# Patient Record
Sex: MALE | Race: WHITE | Employment: OTHER | URBAN - METROPOLITAN AREA
[De-identification: names, ages, dates, MRNs, and addresses within clinical notes are randomized per-mention and may not be internally consistent; named-entity substitution may affect disease eponyms.]

---

## 2019-01-16 ENCOUNTER — HOSPITAL ENCOUNTER (EMERGENCY)
Facility: CLINIC | Age: 84
Discharge: HOME | End: 2019-01-16
Attending: EMERGENCY MEDICINE
Payer: MEDICARE

## 2019-01-16 ENCOUNTER — APPOINTMENT (OUTPATIENT)
Dept: CT IMAGING | Facility: CLINIC | Age: 84
End: 2019-01-16
Payer: MEDICARE

## 2019-01-16 ENCOUNTER — APPOINTMENT (OUTPATIENT)
Dept: GENERAL RADIOLOGY | Facility: CLINIC | Age: 84
End: 2019-01-16
Payer: MEDICARE

## 2019-01-16 VITALS
BODY MASS INDEX: 23.7 KG/M2 | DIASTOLIC BLOOD PRESSURE: 71 MMHG | WEIGHT: 160 LBS | HEIGHT: 69 IN | HEART RATE: 61 BPM | TEMPERATURE: 97.7 F | OXYGEN SATURATION: 97 % | SYSTOLIC BLOOD PRESSURE: 111 MMHG | RESPIRATION RATE: 16 BRPM

## 2019-01-16 DIAGNOSIS — R42 VERTIGO: Primary | ICD-10-CM

## 2019-01-16 LAB
ABSOLUTE EOS #: 0 K/UL (ref 0–0.4)
ABSOLUTE IMMATURE GRANULOCYTE: ABNORMAL K/UL (ref 0–0.3)
ABSOLUTE LYMPH #: 0.8 K/UL (ref 1–4.8)
ABSOLUTE MONO #: 0.6 K/UL (ref 0.1–1.2)
ANION GAP SERPL CALCULATED.3IONS-SCNC: 11 MMOL/L (ref 9–17)
BASOPHILS # BLD: 0 % (ref 0–2)
BASOPHILS ABSOLUTE: 0 K/UL (ref 0–0.2)
BUN BLDV-MCNC: 14 MG/DL (ref 8–23)
BUN/CREAT BLD: NORMAL (ref 9–20)
CALCIUM SERPL-MCNC: 8.6 MG/DL (ref 8.6–10.4)
CHLORIDE BLD-SCNC: 100 MMOL/L (ref 98–107)
CO2: 25 MMOL/L (ref 20–31)
CREAT SERPL-MCNC: 1 MG/DL (ref 0.7–1.2)
DIFFERENTIAL TYPE: ABNORMAL
EOSINOPHILS RELATIVE PERCENT: 1 % (ref 1–4)
GFR AFRICAN AMERICAN: >60 ML/MIN
GFR NON-AFRICAN AMERICAN: >60 ML/MIN
GFR SERPL CREATININE-BSD FRML MDRD: NORMAL ML/MIN/{1.73_M2}
GFR SERPL CREATININE-BSD FRML MDRD: NORMAL ML/MIN/{1.73_M2}
GLUCOSE BLD-MCNC: 98 MG/DL (ref 70–99)
HCT VFR BLD CALC: 40.1 % (ref 41–53)
HEMOGLOBIN: 13.8 G/DL (ref 13.5–17.5)
IMMATURE GRANULOCYTES: ABNORMAL %
INR BLD: 1
LYMPHOCYTES # BLD: 12 % (ref 24–44)
MCH RBC QN AUTO: 32.3 PG (ref 26–34)
MCHC RBC AUTO-ENTMCNC: 34.4 G/DL (ref 31–37)
MCV RBC AUTO: 93.9 FL (ref 80–100)
MONOCYTES # BLD: 9 % (ref 2–11)
NRBC AUTOMATED: ABNORMAL PER 100 WBC
PARTIAL THROMBOPLASTIN TIME: 23.6 SEC (ref 21.3–31.3)
PDW BLD-RTO: 12 % (ref 12.5–15.4)
PLATELET # BLD: 152 K/UL (ref 140–450)
PLATELET ESTIMATE: ABNORMAL
PMV BLD AUTO: 7.6 FL (ref 6–12)
POTASSIUM SERPL-SCNC: 4.3 MMOL/L (ref 3.7–5.3)
PROTHROMBIN TIME: 10.5 SEC (ref 9.4–12.6)
RBC # BLD: 4.28 M/UL (ref 4.5–5.9)
RBC # BLD: ABNORMAL 10*6/UL
SEG NEUTROPHILS: 78 % (ref 36–66)
SEGMENTED NEUTROPHILS ABSOLUTE COUNT: 5.2 K/UL (ref 1.8–7.7)
SODIUM BLD-SCNC: 136 MMOL/L (ref 135–144)
TROPONIN INTERP: NORMAL
TROPONIN INTERP: NORMAL
TROPONIN T: NORMAL NG/ML
TROPONIN T: NORMAL NG/ML
TROPONIN, HIGH SENSITIVITY: 6 NG/L (ref 0–22)
TROPONIN, HIGH SENSITIVITY: 7 NG/L (ref 0–22)
WBC # BLD: 6.5 K/UL (ref 3.5–11)
WBC # BLD: ABNORMAL 10*3/UL

## 2019-01-16 PROCEDURE — 71045 X-RAY EXAM CHEST 1 VIEW: CPT

## 2019-01-16 PROCEDURE — 96361 HYDRATE IV INFUSION ADD-ON: CPT

## 2019-01-16 PROCEDURE — 85730 THROMBOPLASTIN TIME PARTIAL: CPT

## 2019-01-16 PROCEDURE — 96360 HYDRATION IV INFUSION INIT: CPT

## 2019-01-16 PROCEDURE — 99285 EMERGENCY DEPT VISIT HI MDM: CPT

## 2019-01-16 PROCEDURE — 93005 ELECTROCARDIOGRAM TRACING: CPT

## 2019-01-16 PROCEDURE — 85025 COMPLETE CBC W/AUTO DIFF WBC: CPT

## 2019-01-16 PROCEDURE — 84484 ASSAY OF TROPONIN QUANT: CPT

## 2019-01-16 PROCEDURE — 36415 COLL VENOUS BLD VENIPUNCTURE: CPT

## 2019-01-16 PROCEDURE — 85610 PROTHROMBIN TIME: CPT

## 2019-01-16 PROCEDURE — 80048 BASIC METABOLIC PNL TOTAL CA: CPT

## 2019-01-16 PROCEDURE — 2580000003 HC RX 258: Performed by: EMERGENCY MEDICINE

## 2019-01-16 PROCEDURE — 70450 CT HEAD/BRAIN W/O DYE: CPT

## 2019-01-16 RX ORDER — SODIUM CHLORIDE 9 MG/ML
INJECTION, SOLUTION INTRAVENOUS CONTINUOUS
Status: DISCONTINUED | OUTPATIENT
Start: 2019-01-16 | End: 2019-01-16 | Stop reason: HOSPADM

## 2019-01-16 RX ADMIN — SODIUM CHLORIDE: 9 INJECTION, SOLUTION INTRAVENOUS at 09:31

## 2019-01-17 LAB
EKG ATRIAL RATE: 60 BPM
EKG ATRIAL RATE: 62 BPM
EKG P AXIS: 0 DEGREES
EKG P-R INTERVAL: 280 MS
EKG P-R INTERVAL: 294 MS
EKG Q-T INTERVAL: 482 MS
EKG Q-T INTERVAL: 488 MS
EKG QRS DURATION: 142 MS
EKG QRS DURATION: 142 MS
EKG QTC CALCULATION (BAZETT): 488 MS
EKG QTC CALCULATION (BAZETT): 489 MS
EKG R AXIS: 34 DEGREES
EKG R AXIS: 42 DEGREES
EKG T AXIS: 2 DEGREES
EKG T AXIS: 7 DEGREES
EKG VENTRICULAR RATE: 60 BPM
EKG VENTRICULAR RATE: 62 BPM

## 2022-10-24 ENCOUNTER — HOSPITAL ENCOUNTER (OUTPATIENT)
Age: 87
Setting detail: SPECIMEN
Discharge: HOME | End: 2022-10-24

## 2022-10-24 LAB — SODIUM BLD-SCNC: 133 MMOL/L (ref 135–144)

## 2022-11-22 ENCOUNTER — HOSPITAL ENCOUNTER (OUTPATIENT)
Age: 87
Setting detail: SPECIMEN
Discharge: HOME | End: 2022-11-22

## 2022-11-22 LAB — SODIUM BLD-SCNC: 131 MMOL/L (ref 135–144)

## 2022-12-21 ENCOUNTER — HOSPITAL ENCOUNTER (OUTPATIENT)
Age: 87
Setting detail: SPECIMEN
Discharge: HOME | End: 2022-12-21

## 2022-12-21 LAB — SODIUM BLD-SCNC: 133 MMOL/L (ref 135–144)

## 2023-01-27 ENCOUNTER — HOSPITAL ENCOUNTER (OUTPATIENT)
Age: 88
Setting detail: SPECIMEN
Discharge: HOME | End: 2023-01-27

## 2023-01-27 LAB — SODIUM BLD-SCNC: 135 MMOL/L (ref 135–144)

## 2023-03-07 ENCOUNTER — HOSPITAL ENCOUNTER (OUTPATIENT)
Age: 88
Setting detail: SPECIMEN
Discharge: HOME | End: 2023-03-07

## 2023-03-07 LAB — SODIUM SERPL-SCNC: 128 MMOL/L (ref 135–144)

## 2024-04-29 ENCOUNTER — HOSPITAL ENCOUNTER (OUTPATIENT)
Age: 89
Setting detail: THERAPIES SERIES
Discharge: HOME | End: 2024-04-29
Payer: MEDICARE

## 2024-04-29 PROCEDURE — 97140 MANUAL THERAPY 1/> REGIONS: CPT

## 2024-04-29 PROCEDURE — 97161 PT EVAL LOW COMPLEX 20 MIN: CPT

## 2024-04-29 NOTE — CONSULTS
Lumbar/Cervical Traction  90276   [x] Aquatic Therapy   38929 [] Cold/hotpack    [] Massage   05313      [] Dry Needling, 1 or 2 muscles  20560   [] Biofeedback, first 15 minutes   90912  [] Biofeedback, additional 15 minutes   90913 [] Dry Needling, 3 or more muscles  20561     []  Medication allergies reviewed for use of    Dexamethasone Sodium Phosphate 4mg/ml     with iontophoresis treatments.   Pt is not allergic.      Frequency:  2 x/week for 10 visits    Today’s Treatment:  Modalities:   Precautions: na  Exercises/manual therapy  Exercise Reps/ Time Weight/ Level Comments   Physical therapy Theory and education provided regarding symptom centralization and its prognosis for success with mechanical approach performed           Model of spine education provided regarding current diagnosis and location performed           Manual gentle range of motion cervical while assessing dizziness and nystagmus performed Rotation bilaterally and extension           Corner balance next visit      Other:    Specific Instructions for next treatment: corner balance next visit for home program progression      Evaluation Complexity:  History (Personal factors, comorbidities) [] 0 [x] 1-2 [] 3+   Exam (limitations, restrictions) [] 1-2 [] 3 [x] 4+   Clinical presentation (progression) [x] Stable [] Evolving  [] Unstable   Decision Making [x] Low [] Moderate [] High    [x] Low Complexity [] Moderate Complexity [] High Complexity       Treatment Charges: Mins Units   [x] Evaluation       [x]  Low       []  Moderate       []  High 30 1   []  Modalities     []  Ther Exercise     [x]  Manual Therapy 10 1   []  Ther Activities     []  Aquatics     []  Vasocompression     []  Other       TOTAL TREATMENT TIME: 40 min    RESPONSE TO TREATMENT     Time in:0930     Time out:1015    Electronically signed by: Denice Sheffield PT        Physician Signature:________________________________Date:__________________  By signing above or cosigning

## 2024-05-01 ENCOUNTER — HOSPITAL ENCOUNTER (OUTPATIENT)
Age: 89
Setting detail: THERAPIES SERIES
Discharge: HOME | End: 2024-05-01
Payer: MEDICARE

## 2024-05-01 PROCEDURE — 97530 THERAPEUTIC ACTIVITIES: CPT

## 2024-05-01 PROCEDURE — 97140 MANUAL THERAPY 1/> REGIONS: CPT

## 2024-05-01 NOTE — FLOWSHEET NOTE
[x] [facility]  Outpatient Rehabilitation &  Therapy  [address] [place] [address]  [phone]  F: [phone]                        Physical Therapy Daily Treatment Note    Date:  2024  Patient Name:  Adam Finnegan Jr.    :  1934  MRN: [id number]  Physician: Josef Malone MD                                   Insurance: Medicare UHC Adv, No Auth, BMN  Medical Diagnosis: M47.812 Cervical Spondy, Neck stiffness                     Rehab Codes: neck stiffness M43.6  Onset Date: 2023                                  Next 's appt: End of the year, physical  Visit# / total visits: 2/10; Progress note for Medicare patient due at visit 10     Cancels/No Shows: 0/0    Subjective:  Patient reports no change in status yet.     Pain:  [] Yes  [x] No Location: neck N/A Pain Rating: (0-10 scale) 0/10 Just stiff at end ranges        Objective:  Cervical rotation AROM rotation right 60 deg, left 45 deg, extension 40 deg. PROM rotation 80 deg, left 60 deg.    Today’s Treatment:  Modalities: Cervical MHP 10 min at end of session.   Precautions: na  Exercises/manual therapy  Exercise Reps/ Time Weight/ Level Comments   Physical therapy Theory and education provided regarding symptom centralization and its prognosis for success with mechanical approach performed                 Model of spine education provided regarding current diagnosis and location performed                 Manual gentle range of motion cervical while assessing dizziness and nystagmus performed, gentle cervical traction manually performed Grade II, III Rotation bilaterally and extension    30\" x 10 each                 Corner balance feet together, tandem each way, look left right up down, reach left right across body and up, focus on an object look left right up down x 1 rep each  8 min balance on flat surface.       Other:     Specific Instructions for next treatment: corner balance next visit for home program

## 2024-05-13 ENCOUNTER — HOSPITAL ENCOUNTER (OUTPATIENT)
Age: 89
Setting detail: THERAPIES SERIES
Discharge: HOME | End: 2024-05-13
Payer: MEDICARE

## 2024-05-13 PROCEDURE — 97140 MANUAL THERAPY 1/> REGIONS: CPT

## 2024-05-13 PROCEDURE — 97530 THERAPEUTIC ACTIVITIES: CPT

## 2024-05-13 NOTE — FLOWSHEET NOTE
stiffness complaint, possibly adding US to improve blood flow.      Time In:0845            Time Out: 0917    Electronically signed by:  Denice Sheffield PT

## 2024-05-15 ENCOUNTER — HOSPITAL ENCOUNTER (OUTPATIENT)
Age: 89
Setting detail: THERAPIES SERIES
Discharge: HOME | End: 2024-05-15
Payer: MEDICARE

## 2024-05-15 PROCEDURE — 97035 APP MDLTY 1+ULTRASOUND EA 15: CPT

## 2024-05-15 PROCEDURE — 97530 THERAPEUTIC ACTIVITIES: CPT

## 2024-05-15 PROCEDURE — 97140 MANUAL THERAPY 1/> REGIONS: CPT

## 2024-05-15 NOTE — FLOWSHEET NOTE
[x] [facility]  Outpatient Rehabilitation &  Therapy  [address] [place] [address]  [phone]  F: [phone]                        Physical Therapy Daily Treatment Note    Date:  5/15/2024  Patient Name:  Adam Finnegan Jr.    :  1934  MRN: [id number]  Physician: Josef Malone MD                                   Insurance: Medicare UHC Adv, No Auth, BMN  Medical Diagnosis: M47.812 Cervical Spondy, Neck stiffness                     Rehab Codes: neck stiffness M43.6  Onset Date: 2023                                  Next 's appt: End of the year, physical  Visit# / total visits: 4/10; Progress note for Medicare patient due at visit 10     Cancels/No Shows: 0/0    Subjective:  Patient reports perhaps small changes in balance and range of motion in his neck.    Pain:  [] Yes  [x] No Location: neck N/A Pain Rating: (0-10 scale) 0/10 Just stiff at end ranges        Objective:  Cervical rotation AROM rotation right 70 deg, left 60 deg, extension 40 deg. PROM rotation 80 deg, left 70 deg.    Today’s Treatment:  Modalities: Cervical MHP 10 min at end of session. US at 1.0 W per cm squared, 8 min bilateral paracervical musculature  Precautions: na  Exercises/manual therapy  Exercise Reps/ Time Weight/ Level Comments   Physical therapy Theory and education provided regarding symptom centralization and its prognosis for success with mechanical approach performed                 Model of spine education provided regarding current diagnosis and location performed                 Manual gentle range of motion cervical while assessing dizziness and nystagmus performed, gentle cervical traction manually performed Grade II, III Rotation bilaterally and extension    30\" x 10 each                 Corner balance feet together, tandem each way, look left right up down, reach left right DOWN AND across body and up, focus on an object look left right up down x 1 rep each  10 min balance on

## 2024-05-22 ENCOUNTER — APPOINTMENT (OUTPATIENT)
Age: 89
End: 2024-05-22
Payer: MEDICARE

## 2024-05-24 ENCOUNTER — HOSPITAL ENCOUNTER (OUTPATIENT)
Age: 89
Setting detail: THERAPIES SERIES
Discharge: HOME | End: 2024-05-24
Payer: MEDICARE

## 2024-05-24 NOTE — FLOWSHEET NOTE
[] [facility]  Outpatient Rehabilitation &  Therapy  [address]  [phone]  [phone] [] [facility]  Outpatient Rehabilitation &  Therapy  [address]  P: [phone]  F: [phone] [] [facility]  Outpatient Rehabilitation &  Therapy  [address]  P: [phone]  F: [phone] [] [facility]  Outpatient Rehabilitation &  Therapy  [address]  [phone]  [phone] [] [facility]  Outpatient Rehabilitation &  Therapy  [address]   P: [phone]  F: [phone]  [x] [facility]  Outpatient Rehabilitation &  Therapy  [address]  P: [phone]  F: [phone] [] [facility]  Outpatient Rehabilitation &  Therapy  [address]  P: [phone]  F: [phone] [] [facility]  Outpatient Rehabilitation &  Therapy  [address]  P: [phone]  F: [phone] [] [facility]  Outpatient Rehabilitation &  Therapy  [address]  P: [phone]  F: [phone]  [] [facility] Outpatient Rehabilitation &  Therapy  [address]  P: [phone]  F: [phone]     Therapy Cancel/No Show note    Date: 2024  Patient: Adam Finnegan Jr.  : 1934  MRN: [id number]    Cancels/No Shows to date: 0    For today's appointment patient:    [x]  Cancelled    [x] Rescheduled appointment    [] No-show     Reason given by patient:    []  Patient ill    []  Conflicting appointment    [] No transportation      [] Conflict with work    [] No reason given    [] Weather related    [] COVID-19    [x] Other:      Comments:  FAMILY EMERGENCY      [x] Next appointment was confirmed    Electronically signed by: Denice Sheffield PT

## 2024-05-29 ENCOUNTER — HOSPITAL ENCOUNTER (OUTPATIENT)
Age: 89
Setting detail: THERAPIES SERIES
Discharge: HOME | End: 2024-05-29
Payer: MEDICARE

## 2024-05-29 NOTE — PROGRESS NOTES
[x] [facility]  Outpatient Rehabilitation &  Therapy  [address] [place] [address]  [phone]  F: [phone]                        Physical Therapy Daily Treatment Note    Date:  2024  Patient Name:  Adam Finnegan Jr.    :  1934  MRN: [id number]  Physician: Josef Malone MD                                   Insurance: Medicare UHC Adv, No Auth, BMN  Medical Diagnosis: M47.812 Cervical Spondy, Neck stiffness                     Rehab Codes: neck stiffness M43.6  Onset Date: 2023                                  Next 's appt: End of the year, physical  Visit# / total visits: 4/10 (NO VISIT TODAY - REVIEW VERBALLY - REQUEST VESTIBULAR) Progress note for Medicare patient due at visit 10     Cancels/No Shows: 0/0    Subjective:  Patient reports perhaps small changes in balance and range of motion in his neck, but overall has not seen much change and in fact his balance is progressively worsening over the year.        Pain:  [] Yes  [x] No Location: neck N/A Pain Rating: (0-10 scale) 0/10 Just stiff at end ranges        Objective:  Cervical rotation AROM rotation right 70 deg, left 60 deg, extension 40 deg. PROM rotation 80 deg, left 70 deg.    Today’s Treatment:  Modalities: not today  Precautions: na  Exercises/manual therapy  Exercise Reps/ Time Weight/ Level Comments   Physical therapy Theory and education provided regarding symptom centralization and its prognosis for success with mechanical approach VERBAL REVIEW                 Model of spine education provided regarding current diagnosis and location VERBAL REVIEW                 Manual gentle range of motion cervical while assessing dizziness and nystagmus performed, gentle cervical traction manually performed Grade II, III VERBAL REVIEW Rotation bilaterally and extension    30\" x 10 each                 Corner balance feet together, tandem each way, look left right up down, reach left right DOWN AND across

## 2024-06-17 ENCOUNTER — HOSPITAL ENCOUNTER (OUTPATIENT)
Age: 89
Setting detail: THERAPIES SERIES
Discharge: HOME | End: 2024-06-17
Payer: MEDICARE

## 2024-06-17 PROCEDURE — 97164 PT RE-EVAL EST PLAN CARE: CPT

## 2024-06-17 NOTE — FLOWSHEET NOTE
[x] [facility]  Outpatient Rehabilitation &  Therapy  [address] [place] [address]  [phone]  F: [phone]                        Physical Therapy Daily Treatment Note    Date:  2024  Patient Name:  Adam Finnegan Jr.    :  1934  MRN: [id number]  Physician: Josef Malone MD                                   Insurance: Medicare UHC Adv, No Auth, BMN  Medical Diagnosis: M47.812 Cervical Spondy, Neck stiffness                     Rehab Codes: neck stiffness M43.6  Onset Date: 2023                                  Next 's appt: End of the year, physical  Visit# / total visits: 4/10                                                                     Progress note for Medicare patient due at visit 10     Cancels/No Shows: 1/0    Subjective:  Patient reports imbalance progressively worsening over the year.  Denies dizziness.      Pain:  [] Yes  [x] No Location: neck N/A Pain Rating: (0-10 scale) 0/10 Just stiff at end ranges        Objective:  Cervical rotation AROM rotation right 70 deg, left 60 deg, extension 40 deg. PROM rotation 80 deg, left 70 deg.    Today’s Treatment:  Modalities: not today  Precautions: na  Exercises/manual therapy  Exercise Reps/ Time Weight/ Level Comments   Physical therapy Theory and education provided regarding symptom improvement, nature of condition and prognosis VERBAL                  Vestibular education provided regarding anatomy and function VERBAL                  Habituation exercises 1 and 2 x viewing initiated X 10 each vertical and horizontal                         Other:     Specific Instructions for next treatment: Initiate dynamic gait            Treatment Charges: Mins Units   []  Modalities     []  Ther Exercise     []  Manual Therapy     []  Ther Activities     []  Neuro Re-ed     []  Vasocompression     [] Gait     [x]  Other  Reeval 45 1   Total Treatment time         Assessment:  Patient presents with symptoms

## 2024-06-17 NOTE — PROGRESS NOTES
range of services: 4/29/2024 to 5/29/2024    Patient was seen 6 sessions for cervical spine without significant change in symptoms ad was referred for vestibular assessment.  Subjective:  Patient reports imbalance progressively worsening over the year.  Denies dizziness or falls. Primarily has imbalance while walking especially in crowds   Pain:  N/A Location: N/A     Objective:    Subjective Measure Score Indications   Tinetti  25/28     DGI[Dynamic Gait Index]  18/24        Gait Description: Ambulates without AD slight head down position and flexed thoracic spine with decreased lordosis, symmetrical step length and feet close        Red flags & Special Tests:      Test Result   Vertebral Artery B (-)      Cervical spine AROM:     Motion Goniometric Measurement Pain/Symptoms   Flexion WNL     Extension 90% WNL     R, L rotation 90% WNL        Oculomotor Tests- With Fixation (Open Vision):     Test Result- Normal, Abnormal (R) (L) Upward Downward Description   Spontaneous Nystagmus N             Gaze Holding Nystagmus N             Smooth Pursuit LBN AND L TORSIONwith L pursuit             Ocular ROM N, L torsion in look up and left position             Saccades N             Convergence N     N/A N/A     VOR Slow N N/A N/A N/A N/A     VOR Cancellation N N/A N/A N/A N/A           Positional Tests:     Test NYSTAGMUS Symptoms   [place]- R (-) X 2 (-)   [place]- L (-) X 2 (-)   Roll Test (-) B (-)         Problems:                                                                                       [x]  1.  Gait deficit                  [x]  2.  Static balance deficit: Tinetti  [x]  3.  Dynamic balance deficit: Dynamic Gait Index (DGI),           Short Term Goals: (         10    Treatments)    [x] 1. Improved gait mechanics, trajectory to symmetrical with good posture without AD to facilitate balance.  [x] 2. Improve Tinetti score to 28/28 to facilitate decreased fall risk  [x] 3. Demonstrate knowledge of

## 2024-06-27 ENCOUNTER — HOSPITAL ENCOUNTER (OUTPATIENT)
Age: 89
Setting detail: THERAPIES SERIES
Discharge: HOME | End: 2024-06-27
Payer: MEDICARE

## 2024-06-27 PROCEDURE — 97112 NEUROMUSCULAR REEDUCATION: CPT

## 2024-06-27 NOTE — FLOWSHEET NOTE
[x] [facility]  Outpatient Rehabilitation &  Therapy  [address]  [phone]  F: [phone]                        Physical Therapy Daily Treatment Note    Date:  2024  Patient Name:  Adam Finnegan Jr.    :  1934  MRN: [id number]  Physician: Josef Malone MD                                   Insurance: Medicare UHC Adv, No Auth, BMN  Medical Diagnosis: M47.812 Cervical Spondy, Neck stiffness                     Rehab Codes: neck stiffness M43.6, R26.89  Onset Date: 2023                                  Next 's appt: End of the year, physical  Visit# / total visits: 5/10                                                                     Progress note for Medicare patient due at visit 10     Cancels/No Shows: 1/0    Subjective:  Patient reports imbalance.  Denies dizziness. Feels pressure behind L eye      Pain:  [] Yes  [x] No Location: neck N/A Pain Rating: (0-10 scale) 0/10 Just stiff at end ranges        Objective:  Cervical rotation AROM rotation right 70 deg, left 60 deg, extension 40 deg. PROM rotation 80 deg, left 70 deg.    Today’s Treatment:  Modalities: not today  Precautions: na  Exercises/manual therapy  Exercise Reps/ Time Weight/ Level Comments   Walking with head turns horizontal 6 x 30'  CGA      Walking with head turns vertical 6 x 30'  CGA     Marching 2 x 30' CGA    March with contralateral UE knee tap 2 x30' CGA    Hurdles dbl step between 6 x 30' CGA    Hurdles single step between 6 x 30' Rishi    Rebounder foam romberg X 10 CGA Using rainbow ba;;   Rebounder foam SLS X 10 each rishi    Wall SLS  x 10 each CGA    Wall bank wall to floor SLS  X 10 each CGA    River rock diagonals close 6 x 30' CGA    Bosu ball flat romberg 60\" CGA     Bosu ball flat side taps X 10 Rishi    Vestibular education provided regarding anatomy and function VERBAL                  Habituation exercises 1 and 2 x viewing initiated X 10 each vertical and

## 2024-06-27 NOTE — FLOWSHEET NOTE
[x] [facility]  Outpatient Rehabilitation &  Therapy  [address]  [phone]  F: [phone]                        Physical Therapy Daily Treatment Note    Date:  2024  Patient Name:  Adam Finnegan Jr.    :  1934  MRN: [id number]  Physician: Josef Malone MD                                   Insurance: Medicare UHC Adv, No Auth, BMN  Medical Diagnosis: M47.812 Cervical Spondy, Neck stiffness                     Rehab Codes: neck stiffness M43.6  Onset Date: 2023                                  Next 's appt: End of the year, physical  Visit# / total visits: 5/10                                                                     Progress note for Medicare patient due at visit 10     Cancels/No Shows: 1/0    Subjective:  Patient reports imbalance.  Denies dizziness. Feels pressure behind L eye      Pain:  [] Yes  [x] No Location: neck N/A Pain Rating: (0-10 scale) 0/10 Just stiff at end ranges        Objective:  Cervical rotation AROM rotation right 70 deg, left 60 deg, extension 40 deg. PROM rotation 80 deg, left 70 deg.    Today’s Treatment:  Modalities: not today  Precautions: na  Exercises/manual therapy  Exercise Reps/ Time Weight/ Level Comments   Walking with head turns horizontal 6 x 30'  CGA      Walking with head turns vertical 6 x 30'  CGA     Marching 2 x 30' CGA    March with contralateral UE knee tap 2 x30' CGA    Hurdles dbl step between 6 x 30' CGA    Hurdles single step between 6 x 30' Rishi    Rebounder foam romberg X 10 CGA Using rainbow ba;;   Rebounder foam SLS X 10 each rishi    Wall SLS  x 10 each CGA    Wall bank wall to floor SLS  X 10 each CGA    River rock diagonals close 6 x 30' CGA    Bosu ball flat romberg 60\" CGA     Bosu ball flat side taps X 10 Rishi    Vestibular education provided regarding anatomy and function VERBAL                  Habituation exercises 1 and 2 x viewing initiated X 10 each vertical and horizontal

## 2024-07-08 ENCOUNTER — HOSPITAL ENCOUNTER (OUTPATIENT)
Age: 89
Setting detail: THERAPIES SERIES
Discharge: HOME | End: 2024-07-08
Payer: MEDICARE

## 2024-07-08 PROCEDURE — 97112 NEUROMUSCULAR REEDUCATION: CPT

## 2024-07-08 NOTE — FLOWSHEET NOTE
[x] [facility]  Outpatient Rehabilitation &  Therapy  [address]  [phone]  F: [phone]                        Physical Therapy Daily Treatment Note    Date:  2024  Patient Name:  Adam Finnegan Jr.    :  1934  MRN: [id number]  Physician: Josef Malone MD                                   Insurance: Medicare UHC Adv, No Auth, BMN  Medical Diagnosis: M47.812 Cervical Spondy, Neck stiffness , M54.2, H81.90  Unspecified disorder of vestibular function, unspecified ear.                   Rehab Codes: neck stiffness M43.6, R26.89  Onset Date: 2023                                  Next 's appt: End of the year, physical  Visit# / total visits: 6/10                                                                     Progress note for Medicare patient due at visit 10     Cancels/No Shows: 1/0    Subjective:  Patient reports imbalance.  Denies dizziness.   Pain:  [] Yes  [x] No Location: neck N/A Pain Rating: (0-10 scale) 0/10 Just stiff at end ranges        Objective: Ambulates without AD slow pace, decreased step length.     Today’s Treatment:  Modalities: not today  Precautions: na  Exercises/manual therapy  Exercise Reps/ Time Weight/ Level Comments   Walking with head turns horizontal 6 x 30'  CGA      Walking with head turns vertical 6 x 30'  CGA     Marching 2 x 30' CGA    March with contralateral UE knee tap 2 x30' CGA    Hurdles dbl step between 6 x 30' CGA    Hurdles single step between 6 x 30' Rishi    Rebounder foam romberg X 10 CGA Using rainbow ball   Rebounder foam SLS X 10 each rishi    Wall SLS  x 10 each CGA    Wall bank wall to floor SLS  X 10 each CGA    River rock diagonals close and blue discs and blue foam 6 x 30' CGA    Bosu ball flat romberg, SLS 60\" CGA/min A    Bosu ball flat side taps X 10 CGA    Bosu round romberg 60\" CGA    Vestibular education provided regarding anatomy and function VERBAL                  Habituation

## 2024-07-15 ENCOUNTER — HOSPITAL ENCOUNTER (OUTPATIENT)
Age: 89
Setting detail: THERAPIES SERIES
Discharge: HOME | End: 2024-07-15
Payer: MEDICARE

## 2024-07-15 PROCEDURE — 97112 NEUROMUSCULAR REEDUCATION: CPT

## 2024-07-15 NOTE — FLOWSHEET NOTE
[x] [facility]  Outpatient Rehabilitation &  Therapy  [address]  [phone]  F: [phone]                        Physical Therapy Daily Treatment Note    Date:  7/15/2024  Patient Name:  Adam Finnegan Jr.    :  1934  MRN: [id number]  Physician: Josef Malone MD                                   Insurance: Medicare UHC Adv, No Auth, BMN  Medical Diagnosis: M47.812 Cervical Spondy, Neck stiffness , M54.2, H81.90  Unspecified disorder of vestibular function, unspecified ear.                   Rehab Codes: neck stiffness M43.6, R26.89  Onset Date: 2023                                  Next 's appt: End of the year, physical  Visit# / total visits: 7/10                                                                     Progress note for Medicare patient due at visit 10     Cancels/No Shows: 1/0    Subjective:  Patient reports imbalance.  Denies dizziness.   Pain:  [] Yes  [x] No Location: neck N/A Pain Rating: (0-10 scale) 0/10 Just stiff at end ranges        Objective: Ambulates without AD slow pace, decreased step length.     Today’s Treatment:  Modalities: not today  Precautions: na  Exercises/manual therapy  Exercise Reps/ Time Weight/ Level Comments   Walking with head turns horizontal 150'  SBA      Walking with head turns vertical 150'   SBA     Marching 150'  SBA    March with contralateral UE knee tap 2 x30'  SBA    Hurdles dbl step between 4 x 30' CGA    Hurdles single step between $ x 30' Rishi to CGA    Rebounder foam romberg X 10 CGA Using rainbow ball   Rebounder foam SLS X 10 each 2# rishi      .3      Wall bank wall to floor SLS  X 12 each CGA [place] ball   [place]iver [place] diagonals close and blue discs  6 x 30' CGA    Bosu ball flat romberg, SLS 60\" CGA/min A    Bosu ball flat side taps X 10 CGA    Bosu round romberg 60\" CGA x   Vestibular education provided regarding anatomy and function VERBAL                  Habituation exercises 1 and 2 x

## 2024-07-24 ENCOUNTER — HOSPITAL ENCOUNTER (OUTPATIENT)
Age: 89
Setting detail: THERAPIES SERIES
Discharge: HOME | End: 2024-07-24
Payer: MEDICARE

## 2024-07-24 PROCEDURE — 97112 NEUROMUSCULAR REEDUCATION: CPT

## 2024-07-24 NOTE — FLOWSHEET NOTE
[x] [facility]  Outpatient Rehabilitation &  Therapy  [address]  [phone]  F: [phone]                        Physical Therapy Daily Treatment Note    Date:  2024  Patient Name:  Adam Finnegan Jr.    :  1934  MRN: [id number]  Physician: Josef Malone MD                                   Insurance: Medicare UHC Adv, No Auth, BMN  Medical Diagnosis: M47.812 Cervical Spondy, Neck stiffness , M54.2, H81.90  Unspecified disorder of vestibular function, unspecified ear.                   Rehab Codes: neck stiffness M43.6, R26.89  Onset Date: 2023                                  Next 's appt: End of the year, physical  Visit# / total visits: 8/10                                                                     Progress note for Medicare patient due at visit 10     Cancels/No Shows: 1/0    Subjective:  Patient reports imbalance.  Denies dizziness.   Pain:  [] Yes  [x] No Location: neck N/A Pain Rating: (0-10 scale) 0/10 Just stiff at end ranges        Objective: Ambulates without AD slow pace, decreased step length.     Today’s Treatment:  Modalities: not today  Precautions: na  Exercises/manual therapy  Exercise Reps/ Time Weight/ Level Comments   Walking with head turns horizontal 150'  SBA to I      Walking with head turns vertical 150'   SBA to I     Marching 150'  SBA to I    March with contralateral UE knee tap 2 x30'  SBA to I    Hurdles dbl step between  CGA    Hurdles single step between 6 x 30' CGA    Rebounder foam romberg X 10 CGA Using rainbow ball   Rebounder foam SLS X 10 each 2# rishi      Tandem walk/foam 2 x 30\"  SBA to I/ CGA to Rishi    Sidestep foam 6 x 30' CGA    Wall bank wall to floor SLS  X 12 each CGA [place] ball   [place]iver rock diagonals far and smaller 6 x 30' CGA to SBA    Bosu ball flatround SLS 60\" CGA/Rishi    Bosu ball flat side taps  CGA    Bosu round romberg 60\" CGA to SBA x   Vestibular education provided regarding

## 2024-07-29 ENCOUNTER — HOSPITAL ENCOUNTER (OUTPATIENT)
Age: 89
Setting detail: THERAPIES SERIES
Discharge: HOME | End: 2024-07-29
Payer: MEDICARE

## 2024-07-29 PROCEDURE — 97112 NEUROMUSCULAR REEDUCATION: CPT

## 2024-07-29 NOTE — FLOWSHEET NOTE
[x] [facility]  Outpatient Rehabilitation &  Therapy  [address] [facility]  [phone]  F: [phone]                        Physical Therapy Daily Treatment Note    Date:  2024  Patient Name:  Adam Finnegan Jr.    :  1934  MRN: [id number]  Physician: Josef Malone MD                                   Insurance: Medicare UHC Adv, No Auth, BMN  Medical Diagnosis: M47.812 Cervical Spondy, Neck stiffness , M54.2, H81.90  Unspecified disorder of vestibular function, unspecified ear.                   Rehab Codes: neck stiffness M43.6, R26.89  Onset Date: 2023                                  Next 's appt: End of the year, physical  Visit# / total visits: 9/10                                                                     Progress note for Medicare patient due at visit 10     Cancels/No Shows: 1/0    Subjective:  Patient reports imbalance.  Denies dizziness.   Pain:  [] Yes  [x] No Location: neck N/A Pain Rating: (0-10 scale) 0/10 Just stiff at end ranges    Reassess next session     Objective: Ambulates without AD slow pace, decreased step length.     Today’s Treatment:  Modalities: not today  Precautions: na  Exercises/manual therapy  Exercise Reps/ Time Weight/ Level Comments   Walking with head turns horizontal 150'  SBA to I   to home    Walking with head turns vertical 150'   SBA to I  to home   Marching 2 x 30'  SBA to I To home   March with contralateral UE knee tap 2 x30'  SBA to I To home         Hurdles single step between 8 x 30' CGA to SBA    Rebounder foam romberg X 20 2# CGAto SBA    Rebounder foam SLS X 10 each 2# rishi      Tandem walk/foam 2 x 30\"  SBA to I/ CGA to Rishi    Sidestep foam 6 x 30' CGA          [place] diagonals far and smaller 4\" from center 8 x 30' CGA to SBA    Bosu ball flatround SLS 60\" CGA/Rishi    Catch on bosu flast 5min CGA to Rishi    Bosu round romberg 60\" CGA to SBA x   Vestibular education provided regarding

## 2024-08-12 ENCOUNTER — HOSPITAL ENCOUNTER (OUTPATIENT)
Age: 89
Setting detail: THERAPIES SERIES
Discharge: HOME | End: 2024-08-12
Payer: MEDICARE

## 2024-08-12 PROCEDURE — 97112 NEUROMUSCULAR REEDUCATION: CPT

## 2024-08-12 NOTE — PROGRESS NOTES
[x] [facility]  Outpatient Rehabilitation &  Therapy  [address] [facility]  [phone]  F: [phone]                        Physical Therapy Daily Treatment Note/Progress Note    Date:  2024  Patient Name:  Adam Finnegan Jr.    :  1934  MRN: [id number]  Physician: Josef Malone MD                                   Insurance: Medicare UHC Adv, No Auth, BMN  Medical Diagnosis: M47.812 Cervical Spondy, Neck stiffness , M54.2, H81.90  Unspecified disorder of vestibular function, unspecified ear.                   Rehab Codes: neck stiffness M43.6, R26.89  Onset Date: 2023                                  Next 's appt: End of the year, physical  Visit# / total visits: 10/10                                                                     Progress note for Medicare patient due at visit 10     Cancels/No Shows: 1/0    Subjective:  Patient reports imbalance.  Denies dizziness.   Pain:  [] Yes  [x] No Location: neck N/A Pain Rating: (0-10 scale) 0/10 Just stiff at end ranges      Objective: DHI: 20/100, see goals for additional  Ambulates without AD slow pace, improving symmetry     Today’s Treatment:  Modalities: not today  Precautions: na  Exercises/manual therapy  Exercise Reps/ Time Weight/ Level Comments   Walking with head turns horizontal 150'  SBA to I   to home    Walking with head turns vertical 150'   SBA to I  to home   Marching 2 x 30'  SBA to I To home   March with contralateral UE knee tap 2 x30'  SBA to I To home         Hurdles single step between 8 x 30' CGA to SBA    Rebounder foam romberg X 20 2# CGAto SBA    Rebounder foam SLS X 10 each 2# rishi      Tandem walk/foam 6 x 30\"  SBA to I/ CGA to Rishi    Sidestep foam 6 x 30' CGA          [place] diagonals far and smaller 4\" from center 8 x 30' CGA to SBA    Bosu ball flat/round SLS 60\" CGA/Rishi    Catch on bosu flat 5min CGA to Rishi    Bosu round romberg 60\" CGA to SBA x   Vestibular education

## 2024-08-19 ENCOUNTER — HOSPITAL ENCOUNTER (OUTPATIENT)
Age: 89
Setting detail: THERAPIES SERIES
Discharge: HOME | End: 2024-08-19
Payer: MEDICARE

## 2024-08-19 PROCEDURE — 97112 NEUROMUSCULAR REEDUCATION: CPT

## 2024-08-19 NOTE — FLOWSHEET NOTE
[x] [facility]  Outpatient Rehabilitation &  Therapy  [address] [facility]  [phone]  F: [phone]                        Physical Therapy Daily Treatment Note  Date:  2024  Patient Name:  Adam Finnegan Jr.    :  1934  MRN: [id number]  Physician: Josef Malone MD                                   Insurance: Medicare UHC Adv, No Auth, BMN  Medical Diagnosis: M47.812 Cervical Spondy, Neck stiffness , M54.2, H81.90  Unspecified disorder of vestibular function, unspecified ear.                   Rehab Codes: neck stiffness M43.6, R26.89  Onset Date: 2023                                  Next 's appt: End of the year, physical  Visit# / total visits: 11/10                                                                     Progress note for Medicare patient due at visit 10     Cancels/No Shows: 1/0    Subjective:  Patient reports imbalance.  Denies dizziness.   Pain:  [] Yes  [x] No Location: neck N/A Pain Rating: (0-10 scale) 0/10 Just stiff at end ranges      Objective: DHI: 20/100, see goals for additional  Ambulates without AD slow pace, improving symmetry     Today’s Treatment: Lacks confidence with increased challewnges    Precautions: na  Exercises: All Bolded exercises in [employer]:  Exercise Reps/ Time Weight/ Level Comments                                 Hurdles single step between 4 x 30'     Hurdles + river rock      Rebounder foam romberg X 20 2# CGAto SBA    Rebounder foam SLS X 10 each 2# rishi      Tandem walk foam 6 x 30\"  CGA     Sidestep foam 6 x 30' CGA          [place] diagonals far and smaller 4\" from center 8 x 30' CGA to SBA    River [place] blue disc diagonals      Bosu ball flat/round SLS 60\" CGA/modA    Catch on bosu flat 5min CGA to Rishi    Flat bosu with ball pass behind back      Flat bosu with ball pass diagonals      Bosu round romberg 60\" CGA to SBA x   Vestibular education provided regarding anatomy and function         Walking

## 2024-08-29 ENCOUNTER — APPOINTMENT (OUTPATIENT)
Age: 89
End: 2024-08-29
Payer: MEDICARE

## 2024-09-05 ENCOUNTER — APPOINTMENT (OUTPATIENT)
Age: 89
End: 2024-09-05
Payer: MEDICARE

## 2024-09-16 ENCOUNTER — HOSPITAL ENCOUNTER (OUTPATIENT)
Age: 89
Setting detail: THERAPIES SERIES
Discharge: HOME | End: 2024-09-16
Payer: MEDICARE

## 2024-09-16 PROCEDURE — 97110 THERAPEUTIC EXERCISES: CPT

## 2024-09-16 PROCEDURE — 97112 NEUROMUSCULAR REEDUCATION: CPT

## 2024-09-26 ENCOUNTER — HOSPITAL ENCOUNTER (OUTPATIENT)
Age: 89
Setting detail: THERAPIES SERIES
Discharge: HOME | End: 2024-09-26
Payer: MEDICARE

## 2024-09-26 PROCEDURE — 97110 THERAPEUTIC EXERCISES: CPT

## 2024-09-26 PROCEDURE — 97112 NEUROMUSCULAR REEDUCATION: CPT

## 2024-10-03 ENCOUNTER — HOSPITAL ENCOUNTER (OUTPATIENT)
Age: 89
Setting detail: THERAPIES SERIES
Discharge: HOME | End: 2024-10-03
Payer: MEDICARE

## 2024-10-03 PROCEDURE — 97112 NEUROMUSCULAR REEDUCATION: CPT

## 2024-10-03 NOTE — DISCHARGE SUMMARY
prevention                                                                                                                                                        MET     Long Term Goals: (   18           Treatments)  [x] 1.  Improved Dynamic Gait Index(DGI) score to  to 22/24 to facilitate improved balance with dynamic gait for ADL's and commun ity activities.         22/24                MET  [x] 2. Independent with Home Exercise Program (HEP)                                                                                                                                                     MET     Patient goals:  Improve confidence with gait and balance        Pt. Education:  [x] Yes  [x] No  [x] Reviewed Prior HEP/Ed- verbal review  Method of Education: [x] Verbal  [x] Demo  [x] Written  Comprehension of Education:  [x] Verbalizes understanding.  [x] Demonstrates understanding.  [x] Needs review.  [x] Demonstrates/verbalizes HEP/Ed previously given.  Access Code: [email or web address]  URL: [email or web address]  Date: 09/26/2024  Prepared by: Kimberly Ceballos    Exercises  - Gentle Levator Scapulae Stretch  - 1 x daily - 7 x weekly - 1 sets - 5 reps - 10 sec hold    Access Code: [email or web address]  URL: [email or web address]  Date: 09/16/2024  Prepared by: Kimberly Ceballos    Exercises  - Romberg Stance  - 1 x daily - 5 x weekly - 1 sets - 1 reps - 60 sec hold  - Wide Stance with Eyes Closed  - 1 x daily - 5 x weekly - 1 sets - 1 reps - 60 sec hold  - Tandem Stance  - 1 x daily - 5 x weekly - 1 sets - 1 reps - 60 hold  - Single Leg Stance  - 1 x daily - 5 x weekly - 1 sets - 1 reps - 30 sec hold  - Backwards Walking  - 2 x daily - 5 x weekly - 1 sets - 2 reps - 20 feet hold  - Sidestepping  - 2 x daily - 5 x weekly - 1 sets - 2 reps - 20 feet hold  - Walking March  - 2 x daily - 5 x weekly - 1 sets - 2 reps - 20 feet hold  - Walking Tandem Stance  - 2 x daily - 5 x weekly - 1 sets - 2 reps - 20 feet hold  - Walking with Head